# Patient Record
Sex: MALE | Race: WHITE | Employment: FULL TIME | URBAN - METROPOLITAN AREA
[De-identification: names, ages, dates, MRNs, and addresses within clinical notes are randomized per-mention and may not be internally consistent; named-entity substitution may affect disease eponyms.]

---

## 2020-11-02 PROBLEM — M54.12 CERVICAL RADICULOPATHY: Status: ACTIVE | Noted: 2020-11-02

## 2020-12-01 ENCOUNTER — APPOINTMENT (OUTPATIENT)
Dept: LAB | Age: 50
End: 2020-12-01
Attending: PHYSICIAN ASSISTANT
Payer: COMMERCIAL

## 2020-12-01 ENCOUNTER — LAB ENCOUNTER (OUTPATIENT)
Dept: LAB | Age: 50
End: 2020-12-01
Attending: PHYSICIAN ASSISTANT
Payer: COMMERCIAL

## 2020-12-01 DIAGNOSIS — Z11.59 ENCOUNTER FOR SCREENING FOR OTHER VIRAL DISEASES: ICD-10-CM

## 2020-12-01 DIAGNOSIS — M54.12 CERVICAL RADICULOPATHY: ICD-10-CM

## 2020-12-01 DIAGNOSIS — Z01.818 PREOP EXAMINATION: ICD-10-CM

## 2020-12-01 PROCEDURE — 93005 ELECTROCARDIOGRAM TRACING: CPT

## 2020-12-01 PROCEDURE — 93010 ELECTROCARDIOGRAM REPORT: CPT | Performed by: PHYSICIAN ASSISTANT

## 2020-12-01 RX ORDER — SODIUM CHLORIDE, SODIUM LACTATE, POTASSIUM CHLORIDE, CALCIUM CHLORIDE 600; 310; 30; 20 MG/100ML; MG/100ML; MG/100ML; MG/100ML
INJECTION, SOLUTION INTRAVENOUS CONTINUOUS
Status: CANCELLED | OUTPATIENT
Start: 2020-12-01

## 2020-12-02 NOTE — IMAGING NOTE
Spoke to [employer], Medical assistant about a H & P for Elie Sandoval, His procedure is 12/04/2020, asked to speak to a nurse, no nurse available, [employer] will call the patient for a H & P visit. Fax # given to Arianne Sesay.

## 2020-12-03 ENCOUNTER — ANESTHESIA EVENT (OUTPATIENT)
Dept: MRI IMAGING | Facility: HOSPITAL | Age: 50
End: 2020-12-03
Payer: COMMERCIAL

## 2020-12-03 NOTE — IMAGING NOTE
Spoke to Dr. Milton Butcher from Anesthesia, H&P was done on 11/02/2020, Dr. Milton Butcher stated, \"That's Ok to use for the procedure. \" Procedure on 12/04/2020.

## 2020-12-04 ENCOUNTER — ANESTHESIA (OUTPATIENT)
Dept: MRI IMAGING | Facility: HOSPITAL | Age: 50
End: 2020-12-04
Payer: COMMERCIAL

## 2020-12-04 ENCOUNTER — HOSPITAL ENCOUNTER (OUTPATIENT)
Dept: MRI IMAGING | Facility: HOSPITAL | Age: 50
Discharge: HOME | End: 2020-12-04
Attending: PHYSICIAN ASSISTANT
Payer: COMMERCIAL

## 2020-12-04 VITALS
RESPIRATION RATE: 18 BRPM | OXYGEN SATURATION: 95 % | WEIGHT: 280 LBS | DIASTOLIC BLOOD PRESSURE: 74 MMHG | HEART RATE: 95 BPM | TEMPERATURE: 97 F | SYSTOLIC BLOOD PRESSURE: 115 MMHG | BODY MASS INDEX: 37.93 KG/M2 | HEIGHT: 72 IN

## 2020-12-04 DIAGNOSIS — M54.2 NECK PAIN: ICD-10-CM

## 2020-12-04 DIAGNOSIS — M54.12 CERVICAL RADICULOPATHY: Primary | ICD-10-CM

## 2020-12-04 PROCEDURE — 72141 MRI NECK SPINE W/O DYE: CPT | Performed by: PHYSICIAN ASSISTANT

## 2020-12-04 RX ORDER — NALOXONE HYDROCHLORIDE 0.4 MG/ML
80 INJECTION, SOLUTION INTRAMUSCULAR; INTRAVENOUS; SUBCUTANEOUS AS NEEDED
Status: DISCONTINUED | OUTPATIENT
Start: 2020-12-04 | End: 2020-12-05

## 2020-12-04 RX ORDER — HYDROMORPHONE HYDROCHLORIDE 1 MG/ML
0.4 INJECTION, SOLUTION INTRAMUSCULAR; INTRAVENOUS; SUBCUTANEOUS EVERY 5 MIN PRN
Status: DISCONTINUED | OUTPATIENT
Start: 2020-12-04 | End: 2020-12-05

## 2020-12-04 RX ORDER — LIDOCAINE HYDROCHLORIDE 10 MG/ML
INJECTION, SOLUTION EPIDURAL; INFILTRATION; INTRACAUDAL; PERINEURAL AS NEEDED
Status: DISCONTINUED | OUTPATIENT
Start: 2020-12-04 | End: 2020-12-04 | Stop reason: SURG

## 2020-12-04 RX ORDER — MEPERIDINE HYDROCHLORIDE 25 MG/ML
12.5 INJECTION INTRAMUSCULAR; INTRAVENOUS; SUBCUTANEOUS AS NEEDED
Status: DISCONTINUED | OUTPATIENT
Start: 2020-12-04 | End: 2020-12-06

## 2020-12-04 RX ORDER — ONDANSETRON 2 MG/ML
4 INJECTION INTRAMUSCULAR; INTRAVENOUS AS NEEDED
Status: DISCONTINUED | OUTPATIENT
Start: 2020-12-04 | End: 2020-12-05

## 2020-12-04 RX ORDER — HYDROCODONE BITARTRATE AND ACETAMINOPHEN 5; 325 MG/1; MG/1
1 TABLET ORAL AS NEEDED
Status: DISCONTINUED | OUTPATIENT
Start: 2020-12-04 | End: 2020-12-06

## 2020-12-04 RX ORDER — HYDROMORPHONE HYDROCHLORIDE 1 MG/ML
INJECTION, SOLUTION INTRAMUSCULAR; INTRAVENOUS; SUBCUTANEOUS
Status: COMPLETED
Start: 2020-12-04 | End: 2020-12-04

## 2020-12-04 RX ORDER — HYDROCODONE BITARTRATE AND ACETAMINOPHEN 5; 325 MG/1; MG/1
2 TABLET ORAL AS NEEDED
Status: DISCONTINUED | OUTPATIENT
Start: 2020-12-04 | End: 2020-12-06

## 2020-12-04 RX ORDER — SODIUM CHLORIDE, SODIUM LACTATE, POTASSIUM CHLORIDE, CALCIUM CHLORIDE 600; 310; 30; 20 MG/100ML; MG/100ML; MG/100ML; MG/100ML
INJECTION, SOLUTION INTRAVENOUS CONTINUOUS
Status: DISCONTINUED | OUTPATIENT
Start: 2020-12-04 | End: 2020-12-06

## 2020-12-04 RX ADMIN — LIDOCAINE HYDROCHLORIDE 50 MG: 10 INJECTION, SOLUTION EPIDURAL; INFILTRATION; INTRACAUDAL; PERINEURAL at 15:59:00

## 2020-12-04 RX ADMIN — HYDROMORPHONE HYDROCHLORIDE 0.4 MG: 1 INJECTION, SOLUTION INTRAMUSCULAR; INTRAVENOUS; SUBCUTANEOUS at 17:13:00

## 2020-12-04 NOTE — ANESTHESIA POSTPROCEDURE EVALUATION
Dorina 31 Patient Status:  Outpatient   Age/Gender 44year old male MRN [id number]   [facility] MRI Attending Mae Gutierres, [address] Day # 0 PCP Hung Severino MD       Anesthesia Post-op Note    * No procedures liste

## 2020-12-04 NOTE — ANESTHESIA PREPROCEDURE EVALUATION
PRE-OP EVALUATION    Patient Name: Xenia Wu    Pre-op Diagnosis: * No surgery found *    * No surgery found *    * Surgery not found *    Pre-op vitals reviewed.   Temp: 97.2 °F (36.2 °C)  Pulse: 118  Resp: 18  BP: 131/83  SpO2: 94 %  Body mass history. Pulmonary    Pulmonary exam normal.                 Other findings            ASA: 3   Plan: general  NPO status verified and patient meets guidelines.         Comment:  I explained intrinsic risks of general anesthesia, including nausea, d

## 2020-12-04 NOTE — ANESTHESIA PROCEDURE NOTES
Airway  Urgency: elective      General Information and Staff    Patient location during procedure: OR  Anesthesiologist: Tanner Chauhan MD  Performed: anesthesiologist     Indications and Patient Condition  Indications for airway management: anesthesia  S

## 2020-12-18 PROBLEM — M50.20 HNP (HERNIATED NUCLEUS PULPOSUS), CERVICAL: Status: ACTIVE | Noted: 2020-12-18

## 2020-12-28 PROBLEM — I10 ESSENTIAL HYPERTENSION: Status: ACTIVE | Noted: 2020-12-28

## 2021-02-09 ENCOUNTER — APPOINTMENT (OUTPATIENT)
Dept: CT IMAGING | Facility: HOSPITAL | Age: 51
DRG: 684 | End: 2021-02-09
Attending: EMERGENCY MEDICINE
Payer: COMMERCIAL

## 2021-02-09 ENCOUNTER — HOSPITAL ENCOUNTER (INPATIENT)
Facility: HOSPITAL | Age: 51
LOS: 2 days | Discharge: HOME | DRG: 684 | End: 2021-02-11
Attending: EMERGENCY MEDICINE | Admitting: INTERNAL MEDICINE
Payer: COMMERCIAL

## 2021-02-09 DIAGNOSIS — N17.9 ACUTE RENAL FAILURE, UNSPECIFIED ACUTE RENAL FAILURE TYPE (HCC): Primary | ICD-10-CM

## 2021-02-09 LAB
ALBUMIN SERPL-MCNC: 3.5 G/DL (ref 3.4–5)
ALBUMIN/GLOB SERPL: 0.8 {RATIO} (ref 1–2)
ALP LIVER SERPL-CCNC: 53 U/L
ALT SERPL-CCNC: 93 U/L
ANION GAP SERPL CALC-SCNC: 7 MMOL/L (ref 0–18)
ANION GAP SERPL CALC-SCNC: 8 MMOL/L (ref 0–18)
AST SERPL-CCNC: 38 U/L (ref 15–37)
BASOPHILS # BLD AUTO: 0.08 X10(3) UL (ref 0–0.2)
BASOPHILS NFR BLD AUTO: 1.2 %
BILIRUB SERPL-MCNC: 0.4 MG/DL (ref 0.1–2)
BILIRUB UR QL: NEGATIVE
BUN BLD-MCNC: 63 MG/DL (ref 7–18)
BUN BLD-MCNC: 65 MG/DL (ref 7–18)
BUN/CREAT SERPL: 16.7 (ref 10–20)
BUN/CREAT SERPL: 16.9 (ref 10–20)
CALCIUM BLD-MCNC: 9.6 MG/DL (ref 8.5–10.1)
CALCIUM BLD-MCNC: 9.7 MG/DL (ref 8.5–10.1)
CHLORIDE SERPL-SCNC: 103 MMOL/L (ref 98–112)
CHLORIDE SERPL-SCNC: 106 MMOL/L (ref 98–112)
CHLORIDE UR-SCNC: 54 MMOL/L
CLARITY UR: CLEAR
CO2 SERPL-SCNC: 25 MMOL/L (ref 21–32)
CO2 SERPL-SCNC: 26 MMOL/L (ref 21–32)
COLOR UR: YELLOW
CREAT BLD-MCNC: 3.72 MG/DL
CREAT BLD-MCNC: 3.9 MG/DL
CREAT UR-SCNC: 77.1 MG/DL
CREAT UR-SCNC: 79.3 MG/DL
CREAT UR-SCNC: 79.3 MG/DL
CREAT UR-SCNC: 82.2 MG/DL
DEPRECATED RDW RBC AUTO: 44.9 FL (ref 35.1–46.3)
DEPRECATED RDW RBC AUTO: 45.1 FL (ref 35.1–46.3)
EOSINOPHIL # BLD AUTO: 0.18 X10(3) UL (ref 0–0.7)
EOSINOPHIL NFR BLD AUTO: 2.7 %
ERYTHROCYTE [DISTWIDTH] IN BLOOD BY AUTOMATED COUNT: 13.1 % (ref 11–15)
ERYTHROCYTE [DISTWIDTH] IN BLOOD BY AUTOMATED COUNT: 13.2 % (ref 11–15)
GLOBULIN PLAS-MCNC: 4.2 G/DL (ref 2.8–4.4)
GLUCOSE BLD-MCNC: 135 MG/DL (ref 70–99)
GLUCOSE BLD-MCNC: 150 MG/DL (ref 70–99)
GLUCOSE BLDC GLUCOMTR-MCNC: 121 MG/DL (ref 70–99)
GLUCOSE BLDC GLUCOMTR-MCNC: 135 MG/DL (ref 70–99)
GLUCOSE BLDC GLUCOMTR-MCNC: 138 MG/DL (ref 70–99)
GLUCOSE BLDC GLUCOMTR-MCNC: 138 MG/DL (ref 70–99)
GLUCOSE BLDC GLUCOMTR-MCNC: 155 MG/DL (ref 70–99)
GLUCOSE UR-MCNC: NEGATIVE MG/DL
HAV IGM SER QL: 2.2 MG/DL (ref 1.6–2.6)
HCT VFR BLD AUTO: 31.6 %
HCT VFR BLD AUTO: 33.1 %
HGB BLD-MCNC: 10.8 G/DL
HGB BLD-MCNC: 11.4 G/DL
HGB UR QL STRIP.AUTO: NEGATIVE
IMM GRANULOCYTES # BLD AUTO: 0.01 X10(3) UL (ref 0–1)
IMM GRANULOCYTES NFR BLD: 0.1 %
KETONES UR-MCNC: NEGATIVE MG/DL
LEUKOCYTE ESTERASE UR QL STRIP.AUTO: NEGATIVE
LYMPHOCYTES # BLD AUTO: 2.14 X10(3) UL (ref 1–4)
LYMPHOCYTES NFR BLD AUTO: 31.9 %
M PROTEIN MFR SERPL ELPH: 7.7 G/DL (ref 6.4–8.2)
MCH RBC QN AUTO: 32 PG (ref 26–34)
MCH RBC QN AUTO: 32 PG (ref 26–34)
MCHC RBC AUTO-ENTMCNC: 34.2 G/DL (ref 31–37)
MCHC RBC AUTO-ENTMCNC: 34.4 G/DL (ref 31–37)
MCV RBC AUTO: 93 FL
MCV RBC AUTO: 93.8 FL
MICROALBUMIN UR-MCNC: 0.53 MG/DL
MICROALBUMIN/CREAT 24H UR-RTO: 6.7 UG/MG (ref ?–30)
MONOCYTES # BLD AUTO: 0.69 X10(3) UL (ref 0.1–1)
MONOCYTES NFR BLD AUTO: 10.3 %
NEUTROPHILS # BLD AUTO: 3.61 X10 (3) UL (ref 1.5–7.7)
NEUTROPHILS # BLD AUTO: 3.61 X10(3) UL (ref 1.5–7.7)
NEUTROPHILS NFR BLD AUTO: 53.8 %
NITRITE UR QL STRIP.AUTO: NEGATIVE
OSMOLALITY SERPL CALC.SUM OF ELEC: 306 MOSM/KG (ref 275–295)
OSMOLALITY SERPL CALC.SUM OF ELEC: 306 MOSM/KG (ref 275–295)
PH UR: 5 [PH] (ref 5–8)
PLATELET # BLD AUTO: 227 10(3)UL (ref 150–450)
PLATELET # BLD AUTO: 233 10(3)UL (ref 150–450)
POTASSIUM SERPL-SCNC: 4.8 MMOL/L (ref 3.5–5.1)
POTASSIUM SERPL-SCNC: 4.9 MMOL/L (ref 3.5–5.1)
POTASSIUM UR-SCNC: 20.2 MMOL/L
PROT UR-MCNC: <5 MG/DL
PROT UR-MCNC: <5 MG/DL
PROT UR-MCNC: NEGATIVE MG/DL
RBC # BLD AUTO: 3.37 X10(6)UL
RBC # BLD AUTO: 3.56 X10(6)UL
SARS-COV-2 RNA RESP QL NAA+PROBE: NOT DETECTED
SODIUM SERPL-SCNC: 137 MMOL/L (ref 136–145)
SODIUM SERPL-SCNC: 138 MMOL/L (ref 136–145)
SODIUM SERPL-SCNC: 47 MMOL/L
SODIUM SERPL-SCNC: 51 MMOL/L
SP GR UR STRIP: 1.01 (ref 1–1.03)
UROBILINOGEN UR STRIP-ACNC: <2
WBC # BLD AUTO: 6.1 X10(3) UL (ref 4–11)
WBC # BLD AUTO: 6.7 X10(3) UL (ref 4–11)

## 2021-02-09 PROCEDURE — 96361 HYDRATE IV INFUSION ADD-ON: CPT

## 2021-02-09 PROCEDURE — 82570 ASSAY OF URINE CREATININE: CPT | Performed by: EMERGENCY MEDICINE

## 2021-02-09 PROCEDURE — 82436 ASSAY OF URINE CHLORIDE: CPT | Performed by: EMERGENCY MEDICINE

## 2021-02-09 PROCEDURE — 82570 ASSAY OF URINE CREATININE: CPT | Performed by: INTERNAL MEDICINE

## 2021-02-09 PROCEDURE — 84156 ASSAY OF PROTEIN URINE: CPT | Performed by: INTERNAL MEDICINE

## 2021-02-09 PROCEDURE — 84300 ASSAY OF URINE SODIUM: CPT | Performed by: EMERGENCY MEDICINE

## 2021-02-09 PROCEDURE — 84156 ASSAY OF PROTEIN URINE: CPT | Performed by: EMERGENCY MEDICINE

## 2021-02-09 PROCEDURE — 99285 EMERGENCY DEPT VISIT HI MDM: CPT

## 2021-02-09 PROCEDURE — 81003 URINALYSIS AUTO W/O SCOPE: CPT | Performed by: EMERGENCY MEDICINE

## 2021-02-09 PROCEDURE — 82962 GLUCOSE BLOOD TEST: CPT

## 2021-02-09 PROCEDURE — 74176 CT ABD & PELVIS W/O CONTRAST: CPT | Performed by: EMERGENCY MEDICINE

## 2021-02-09 PROCEDURE — 80048 BASIC METABOLIC PNL TOTAL CA: CPT | Performed by: EMERGENCY MEDICINE

## 2021-02-09 PROCEDURE — 96360 HYDRATION IV INFUSION INIT: CPT

## 2021-02-09 PROCEDURE — 85025 COMPLETE CBC W/AUTO DIFF WBC: CPT | Performed by: EMERGENCY MEDICINE

## 2021-02-09 PROCEDURE — 85027 COMPLETE CBC AUTOMATED: CPT | Performed by: INTERNAL MEDICINE

## 2021-02-09 PROCEDURE — 84133 ASSAY OF URINE POTASSIUM: CPT | Performed by: EMERGENCY MEDICINE

## 2021-02-09 PROCEDURE — 82043 UR ALBUMIN QUANTITATIVE: CPT | Performed by: INTERNAL MEDICINE

## 2021-02-09 PROCEDURE — 84300 ASSAY OF URINE SODIUM: CPT | Performed by: INTERNAL MEDICINE

## 2021-02-09 PROCEDURE — 83735 ASSAY OF MAGNESIUM: CPT | Performed by: INTERNAL MEDICINE

## 2021-02-09 PROCEDURE — 80053 COMPREHEN METABOLIC PANEL: CPT | Performed by: EMERGENCY MEDICINE

## 2021-02-09 RX ORDER — SODIUM CHLORIDE 9 MG/ML
INJECTION, SOLUTION INTRAVENOUS CONTINUOUS
Status: DISCONTINUED | OUTPATIENT
Start: 2021-02-09 | End: 2021-02-11

## 2021-02-09 RX ORDER — DEXTROSE MONOHYDRATE 25 G/50ML
50 INJECTION, SOLUTION INTRAVENOUS
Status: DISCONTINUED | OUTPATIENT
Start: 2021-02-09 | End: 2021-02-11

## 2021-02-09 RX ORDER — ONDANSETRON 2 MG/ML
4 INJECTION INTRAMUSCULAR; INTRAVENOUS EVERY 6 HOURS PRN
Status: DISCONTINUED | OUTPATIENT
Start: 2021-02-09 | End: 2021-02-11

## 2021-02-09 RX ORDER — HEPARIN SODIUM 5000 [USP'U]/ML
5000 INJECTION, SOLUTION INTRAVENOUS; SUBCUTANEOUS EVERY 8 HOURS SCHEDULED
Status: DISCONTINUED | OUTPATIENT
Start: 2021-02-09 | End: 2021-02-11

## 2021-02-09 RX ORDER — LIDOCAINE 50 MG/G
OINTMENT TOPICAL AS NEEDED
Status: DISCONTINUED | OUTPATIENT
Start: 2021-02-09 | End: 2021-02-11

## 2021-02-09 RX ORDER — PROCHLORPERAZINE EDISYLATE 5 MG/ML
5 INJECTION INTRAMUSCULAR; INTRAVENOUS EVERY 8 HOURS PRN
Status: DISCONTINUED | OUTPATIENT
Start: 2021-02-09 | End: 2021-02-11

## 2021-02-09 RX ORDER — SODIUM CHLORIDE 9 MG/ML
INJECTION, SOLUTION INTRAVENOUS CONTINUOUS
Status: ACTIVE | OUTPATIENT
Start: 2021-02-09 | End: 2021-02-09

## 2021-02-09 RX ORDER — GABAPENTIN 300 MG/1
300 CAPSULE ORAL NIGHTLY
Status: DISCONTINUED | OUTPATIENT
Start: 2021-02-09 | End: 2021-02-11

## 2021-02-09 RX ORDER — ONDANSETRON 2 MG/ML
4 INJECTION INTRAMUSCULAR; INTRAVENOUS EVERY 4 HOURS PRN
Status: DISCONTINUED | OUTPATIENT
Start: 2021-02-09 | End: 2021-02-11

## 2021-02-09 RX ORDER — ACETAMINOPHEN 325 MG/1
650 TABLET ORAL EVERY 6 HOURS PRN
Status: DISCONTINUED | OUTPATIENT
Start: 2021-02-09 | End: 2021-02-11

## 2021-02-09 RX ORDER — SODIUM CHLORIDE 9 MG/ML
1000 INJECTION, SOLUTION INTRAVENOUS ONCE
Status: COMPLETED | OUTPATIENT
Start: 2021-02-09 | End: 2021-02-09

## 2021-02-09 NOTE — CONSULTS
[facility] HOSP - [facility]    Report of Consultation    Manas Arnold Patient Status:  Inpatient    1970 MRN [id number]   Location [facility] 4W/SW/SE Attending Rufina Liang, DO   Hosp Day # 0 PCP Nish Haynes MD     Date of Admis tobacco: Never Used    Alcohol use: Not Currently      Comment: rarely    Drug use: No          Current Medications:    •  ondansetron HCl (ZOFRAN) injection 4 mg, 4 mg, Intravenous, Q4H PRN    •  0.9% NaCl infusion, , Intravenous, Continuous    •  Heparin (36.5 °C), temperature source Oral, resp. rate 18, height 6' (1.829 m), weight 285 lb (129.3 kg), SpO2 99 %. General: Obese No acute distress. Alert and oriented x 3. HEENT: Moist mucous membranes. EOM-I. PERRL  Neck: No JVD.   Respiratory: Clear to au valsartan, hydrochlorothiazide, triamterene and diclofenac.  - Hold above  - reduce IVF to [email or web address]    Renal calculi:  - Had previous calculi in the past  - currently non obstructive. - Will plan for OP evaluation post discharge.     HTN:  - Resume amlod

## 2021-02-09 NOTE — ED NOTES
Orders for admission, patient is aware of plan and ready to go upstairs.  Any questions, please call ED ANTHONY Adams at extension [phone]  Type of COVID test sent:Rapid  COVID Suspicion level: Low    Titratable drug(s) infusing:None  Rate:    LOC at time of t

## 2021-02-09 NOTE — ED PROVIDER NOTES
Patient Seen in: [facility] AND [facility] Emergency Department    History   Patient presents with:  Abnormal Result      HPI    Patient presents to the ED for evaluation.   Recent blood testing shows acute kidney failure and patient was referred to the ER for ev Smokeless tobacco: Never Used    Substance and Sexual Activity      Alcohol use: Not Currently        Comment: rarely      Drug use: No      ROS  Pertinent Positives: No complaints  All other organ systems are reviewed and are negative.     Constitutional a following components:       Result Value    Glucose 150 (*)     BUN 65 (*)     Creatinine 3.90 (*)     Calculated Osmolality 306 (*)     GFR, Non- 17 (*)     GFR, -American 19 (*)     All other components within normal limits   CBC W inflammation. Appendix is normal. No free air or free fluid. Mild hepatic steatosis. The results were faxed/finalized only at 0400 ET. If you would like to discuss this case directly please call [phone] (extension [phone]).   If you can't reach me at Medication List        Present on Admission  Date Reviewed: 2/7/2021          ICD-10-CM Noted POA    [facility] N17.9 2/9/2021 Unknown

## 2021-02-09 NOTE — H&P
[facility] Hospitalist H&P       CC:      PCP: Cem Fernandez MD    History of Present Illness:   Mr. Rima Lerma is a very pleasant 44year old with history of diabetes mellitus type 2 (recently diagnosed per patient), Hypertension, cervical disc disease, who prese Used    Alcohol use: Not Currently      Comment: rarely     Fam Hx  Family History   Problem Relation Age of Onset   • Diabetes Mother         Insulin dependent type 2   • Heart Disease Mother    • Other (Other) Maternal Grandfather         unknown      OB presents to the hospital for evaluation of abnormal kidney function. Acute kidney injury   -multifactorial, but high suspicion for ATN given recent NSAID use, on top of that 3 out of 4 of his BP meds can cause JERED. -UA with no casts.  No protein  -neph

## 2021-02-09 NOTE — ED NOTES
Pt sent by PCP for elevated renal function tests done today. Pt denies any complaints. Will continue to monitor.

## 2021-02-09 NOTE — PLAN OF CARE
Patient alert, oriented, stable and comfortable on room air throughout shift. Tolerating diet, ambulating independently, voiding freely.  IV fluids 0.9 running at 83mL/hr, topical Lidocaine cream ordered for Right sided shingles pain PRN as well as nightly comfort level or patient's stated pain goal  Description: INTERVENTIONS:  - Encourage pt to monitor pain and request assistance  - Assess pain using appropriate pain scale  - Administer analgesics based on type and severity of pain and evaluate response  - care, etc)  - Arrange for interpreters to assist at discharge as needed  - Consider post-discharge preferences of patient/family/discharge partner  - Complete POLST form as appropriate  - Assess patient's ability to be responsible for managing their own he

## 2021-02-09 NOTE — ED INITIAL ASSESSMENT (HPI)
Pt sent from PCP for abnormal labs, States his renal function results were elevated. Denies any urinary s/s.

## 2021-02-10 LAB
ALBUMIN SERPL-MCNC: 3.3 G/DL (ref 3.4–5)
ANION GAP SERPL CALC-SCNC: 7 MMOL/L (ref 0–18)
BUN BLD-MCNC: 55 MG/DL (ref 7–18)
BUN/CREAT SERPL: 17.7 (ref 10–20)
CALCIUM BLD-MCNC: 9.1 MG/DL (ref 8.5–10.1)
CHLORIDE SERPL-SCNC: 109 MMOL/L (ref 98–112)
CO2 SERPL-SCNC: 25 MMOL/L (ref 21–32)
CREAT BLD-MCNC: 3.11 MG/DL
GLUCOSE BLD-MCNC: 134 MG/DL (ref 70–99)
GLUCOSE BLDC GLUCOMTR-MCNC: 128 MG/DL (ref 70–99)
GLUCOSE BLDC GLUCOMTR-MCNC: 129 MG/DL (ref 70–99)
GLUCOSE BLDC GLUCOMTR-MCNC: 141 MG/DL (ref 70–99)
GLUCOSE BLDC GLUCOMTR-MCNC: 171 MG/DL (ref 70–99)
HAV IGM SER QL: 2.1 MG/DL (ref 1.6–2.6)
OSMOLALITY SERPL CALC.SUM OF ELEC: 309 MOSM/KG (ref 275–295)
PHOSPHATE SERPL-MCNC: 4.6 MG/DL (ref 2.5–4.9)
POTASSIUM SERPL-SCNC: 4.5 MMOL/L (ref 3.5–5.1)
SODIUM SERPL-SCNC: 141 MMOL/L (ref 136–145)

## 2021-02-10 PROCEDURE — 80069 RENAL FUNCTION PANEL: CPT | Performed by: INTERNAL MEDICINE

## 2021-02-10 PROCEDURE — 83735 ASSAY OF MAGNESIUM: CPT | Performed by: INTERNAL MEDICINE

## 2021-02-10 PROCEDURE — 82962 GLUCOSE BLOOD TEST: CPT

## 2021-02-10 NOTE — PLAN OF CARE
Vital signs stable. Patient is alert and oriented x4. Patient's activity/mobility level is independent. Patient is voiding freely. On Heparin for DVT prophylaxis. Fall precautions in place and maintained.  Call light within reach, bed locked and in lowest Short Term Goal  Description: Patient's Short Term Goal: Figure out why my lab values are high     Interventions:   - See additional Care Plan goals for specific interventions  2/10/2021 0751 by Ava Coreas RN  Outcome: Progressing  2/10/2021 0427 by Mel Holden risk of injury  - Provide assistive devices as appropriate  - Consider OT/PT consult to assist with strengthening/mobility  - Encourage toileting schedule  2/10/2021 8881 by Alberto Gordon RN  Outcome: Progressing  2/10/2021 0427 by Alberto Gordon RN  Ou

## 2021-02-10 NOTE — PROGRESS NOTES
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

## 2021-02-10 NOTE — PLAN OF CARE
Pt is alert/oriented. Vitals stable. Tolerating general diet. Blood sugars stable. Ambulating independently. IVF infusing. No complaints of pain. Heparin and SCDs for DVT prophylaxis. Voiding adequately. Last BM yesterday 2/9 .  Kidney labs improved today - level or patient's stated pain goal  Description: INTERVENTIONS:  - Encourage pt to monitor pain and request assistance  - Assess pain using appropriate pain scale  - Administer analgesics based on type and severity of pain and evaluate response  - Impleme etc)  - Arrange for interpreters to assist at discharge as needed  - Consider post-discharge preferences of patient/family/discharge partner  - Complete POLST form as appropriate  - Assess patient's ability to be responsible for managing their own health

## 2021-02-11 VITALS
HEART RATE: 78 BPM | DIASTOLIC BLOOD PRESSURE: 69 MMHG | HEIGHT: 72 IN | SYSTOLIC BLOOD PRESSURE: 123 MMHG | TEMPERATURE: 98 F | BODY MASS INDEX: 38.6 KG/M2 | RESPIRATION RATE: 18 BRPM | OXYGEN SATURATION: 94 % | WEIGHT: 285 LBS

## 2021-02-11 LAB
ALBUMIN SERPL-MCNC: 3.2 G/DL (ref 3.4–5)
ANION GAP SERPL CALC-SCNC: 5 MMOL/L (ref 0–18)
BUN BLD-MCNC: 49 MG/DL (ref 7–18)
BUN/CREAT SERPL: 17.9 (ref 10–20)
CALCIUM BLD-MCNC: 8.9 MG/DL (ref 8.5–10.1)
CHLORIDE SERPL-SCNC: 112 MMOL/L (ref 98–112)
CO2 SERPL-SCNC: 25 MMOL/L (ref 21–32)
CREAT BLD-MCNC: 2.74 MG/DL
GLUCOSE BLD-MCNC: 134 MG/DL (ref 70–99)
GLUCOSE BLDC GLUCOMTR-MCNC: 142 MG/DL (ref 70–99)
HAV IGM SER QL: 2 MG/DL (ref 1.6–2.6)
OSMOLALITY SERPL CALC.SUM OF ELEC: 309 MOSM/KG (ref 275–295)
PHOSPHATE SERPL-MCNC: 4.5 MG/DL (ref 2.5–4.9)
POTASSIUM SERPL-SCNC: 4.3 MMOL/L (ref 3.5–5.1)
SODIUM SERPL-SCNC: 142 MMOL/L (ref 136–145)

## 2021-02-11 PROCEDURE — 80069 RENAL FUNCTION PANEL: CPT | Performed by: INTERNAL MEDICINE

## 2021-02-11 PROCEDURE — 83735 ASSAY OF MAGNESIUM: CPT | Performed by: INTERNAL MEDICINE

## 2021-02-11 PROCEDURE — 82962 GLUCOSE BLOOD TEST: CPT

## 2021-02-11 RX ORDER — AMLODIPINE BESYLATE 5 MG/1
5 TABLET ORAL DAILY
Qty: 90 TABLET | Refills: 0 | Status: SHIPPED | OUTPATIENT
Start: 2021-02-12 | End: 2021-10-07 | Stop reason: ALTCHOICE

## 2021-02-11 NOTE — PROGRESS NOTES
[facility]    Progress Note    Jose Rafael Hoff Patient Status:  Inpatient    1970 MRN [id number]   Location [facility] 4W/SW/SE Attending Ab Bahena,    Hosp Day # 2 PCP Kee Rivera MD       Subjective:   Devyn Underwood above     Renal calculi:  - Had previous calculi in the past  - currently non obstructive.   - Will plan for OP evaluation post discharge.     HTN:  - Resume amlodipine  - Can start on coreg and/ or Hydralazine as needed  - Currently BP controlled.     Cerv

## 2021-02-11 NOTE — PLAN OF CARE
Pt alert and oriented x4. On room air. Tolerating diet, no nausea. Voiding freely, no issues. Kidney fx improving per labs. IVF. Ambulating independently with steady gait. Fall precautions in place. Cleared for discharge. Instructions given.  Education give goal  Description: INTERVENTIONS:  - Encourage pt to monitor pain and request assistance  - Assess pain using appropriate pain scale  - Administer analgesics based on type and severity of pain and evaluate response  - Implement non-pharmacological measures etc)  - Arrange for interpreters to assist at discharge as needed  - Consider post-discharge preferences of patient/family/discharge partner  - Complete POLST form as appropriate  - Assess patient's ability to be responsible for managing their own health

## 2021-02-11 NOTE — PLAN OF CARE
Patient alert and oriented. VSS. No complaints of pain. Patient ambulating. Voiding via urinal. Renal function improving, plan for discharge based off morning labs. Patient updated on plan of care.  Call light within reach, bed in lowest position, fall prec Encourage pt to monitor pain and request assistance  - Assess pain using appropriate pain scale  - Administer analgesics based on type and severity of pain and evaluate response  - Implement non-pharmacological measures as appropriate and evaluate response Consider post-discharge preferences of patient/family/discharge partner  - Complete POLST form as appropriate  - Assess patient's ability to be responsible for managing their own health  - Refer to Case Management Department for coordinating discharge plan

## 2021-02-11 NOTE — DISCHARGE SUMMARY
General Medicine Discharge Summary     Patient ID:  Clari Barboza  44year old  12/27/1970    Admit date: 2/9/2021    Discharge date and time: 2/11/21    Attending Physician: Sabrina Quispe DO daily.      CONTINUE these medications which have NOT CHANGED    glimepiride 2 MG Oral Tab  Take 1 tablet (2 mg total) by mouth daily with breakfast.    Lancets Does not apply Misc  Testing daily    Blood Glucose Monitoring Suppl w/Device Does not apply Ki

## 2021-02-24 ENCOUNTER — LAB ENCOUNTER (OUTPATIENT)
Dept: LAB | Age: 51
End: 2021-02-24
Attending: ORTHOPAEDIC SURGERY
Payer: COMMERCIAL

## 2021-02-24 DIAGNOSIS — N18.2 CHRONIC KIDNEY DISEASE (CKD), ACTIVE MEDICAL MANAGEMENT WITHOUT DIALYSIS, STAGE 2 (MILD): ICD-10-CM

## 2021-02-24 DIAGNOSIS — I10 ESSENTIAL HYPERTENSION: ICD-10-CM

## 2021-02-24 DIAGNOSIS — N20.0 NEPHROLITHIASIS: ICD-10-CM

## 2021-02-24 DIAGNOSIS — Z01.818 PREOP TESTING: ICD-10-CM

## 2021-02-24 DIAGNOSIS — N17.0 ACUTE RENAL FAILURE WITH TUBULAR NECROSIS (HCC): ICD-10-CM

## 2021-02-24 LAB
ALBUMIN SERPL-MCNC: 3.8 G/DL (ref 3.4–5)
ANION GAP SERPL CALC-SCNC: 4 MMOL/L (ref 0–18)
BUN BLD-MCNC: 19 MG/DL (ref 7–18)
BUN/CREAT SERPL: 11.7 (ref 10–20)
CALCIUM BLD-MCNC: 9.6 MG/DL (ref 8.5–10.1)
CHLORIDE SERPL-SCNC: 106 MMOL/L (ref 98–112)
CO2 SERPL-SCNC: 28 MMOL/L (ref 21–32)
CREAT BLD-MCNC: 1.62 MG/DL
CREAT UR-SCNC: 139 MG/DL
EST. AVERAGE GLUCOSE BLD GHB EST-MCNC: 180 MG/DL (ref 68–126)
GLUCOSE BLD-MCNC: 140 MG/DL (ref 70–99)
HBA1C MFR BLD HPLC: 7.9 % (ref ?–5.7)
MICROALBUMIN UR-MCNC: <0.5 MG/DL
OSMOLALITY SERPL CALC.SUM OF ELEC: 291 MOSM/KG (ref 275–295)
PHOSPHATE SERPL-MCNC: 2.3 MG/DL (ref 2.5–4.9)
POTASSIUM SERPL-SCNC: 3.9 MMOL/L (ref 3.5–5.1)
SODIUM SERPL-SCNC: 138 MMOL/L (ref 136–145)

## 2021-02-24 PROCEDURE — 80069 RENAL FUNCTION PANEL: CPT

## 2021-02-24 PROCEDURE — 82043 UR ALBUMIN QUANTITATIVE: CPT

## 2021-02-24 PROCEDURE — 83036 HEMOGLOBIN GLYCOSYLATED A1C: CPT

## 2021-02-24 PROCEDURE — 82570 ASSAY OF URINE CREATININE: CPT

## 2021-02-24 PROCEDURE — 36415 COLL VENOUS BLD VENIPUNCTURE: CPT

## 2021-02-24 RX ORDER — MULTIVIT-MIN/IRON FUM/FOLIC AC 7.5 MG-4
1 TABLET ORAL DAILY
COMMUNITY

## 2021-02-24 RX ORDER — GABAPENTIN 300 MG/1
300 CAPSULE ORAL DAILY
COMMUNITY
End: 2021-10-07 | Stop reason: ALTCHOICE

## 2021-02-25 LAB — SARS-COV-2 RNA RESP QL NAA+PROBE: NOT DETECTED

## 2021-02-26 ENCOUNTER — APPOINTMENT (OUTPATIENT)
Dept: GENERAL RADIOLOGY | Facility: HOSPITAL | Age: 51
End: 2021-02-26
Attending: ORTHOPAEDIC SURGERY
Payer: COMMERCIAL

## 2021-02-26 ENCOUNTER — HOSPITAL ENCOUNTER (OUTPATIENT)
Facility: HOSPITAL | Age: 51
Discharge: HOME | End: 2021-02-27
Attending: ORTHOPAEDIC SURGERY | Admitting: ORTHOPAEDIC SURGERY
Payer: COMMERCIAL

## 2021-02-26 ENCOUNTER — ANESTHESIA (OUTPATIENT)
Dept: SURGERY | Facility: HOSPITAL | Age: 51
End: 2021-02-26
Payer: COMMERCIAL

## 2021-02-26 ENCOUNTER — ANESTHESIA EVENT (OUTPATIENT)
Dept: SURGERY | Facility: HOSPITAL | Age: 51
End: 2021-02-26
Payer: COMMERCIAL

## 2021-02-26 DIAGNOSIS — M54.12 CERVICAL RADICULOPATHY: ICD-10-CM

## 2021-02-26 DIAGNOSIS — Z01.818 PREOP TESTING: Primary | ICD-10-CM

## 2021-02-26 LAB
GLUCOSE BLDC GLUCOMTR-MCNC: 159 MG/DL (ref 70–99)
GLUCOSE BLDC GLUCOMTR-MCNC: 175 MG/DL (ref 70–99)
GLUCOSE BLDC GLUCOMTR-MCNC: 196 MG/DL (ref 70–99)
GLUCOSE BLDC GLUCOMTR-MCNC: 269 MG/DL (ref 70–99)

## 2021-02-26 PROCEDURE — 82962 GLUCOSE BLOOD TEST: CPT

## 2021-02-26 PROCEDURE — 88304 TISSUE EXAM BY PATHOLOGIST: CPT | Performed by: ORTHOPAEDIC SURGERY

## 2021-02-26 PROCEDURE — 88311 DECALCIFY TISSUE: CPT | Performed by: ORTHOPAEDIC SURGERY

## 2021-02-26 PROCEDURE — 0RB30ZZ EXCISION OF CERVICAL VERTEBRAL DISC, OPEN APPROACH: ICD-10-PCS | Performed by: ORTHOPAEDIC SURGERY

## 2021-02-26 PROCEDURE — 01N10ZZ RELEASE CERVICAL NERVE, OPEN APPROACH: ICD-10-PCS | Performed by: ORTHOPAEDIC SURGERY

## 2021-02-26 PROCEDURE — 76000 FLUOROSCOPY <1 HR PHYS/QHP: CPT | Performed by: ORTHOPAEDIC SURGERY

## 2021-02-26 PROCEDURE — 0RG10A0 FUSION OF CERVICAL VERTEBRAL JOINT WITH INTERBODY FUSION DEVICE, ANTERIOR APPROACH, ANTERIOR COLUMN, OPEN APPROACH: ICD-10-PCS | Performed by: ORTHOPAEDIC SURGERY

## 2021-02-26 DEVICE — IMPLANTABLE DEVICE: Type: IMPLANTABLE DEVICE | Site: NECK | Status: FUNCTIONAL

## 2021-02-26 DEVICE — PLATE CERV SKY TI 1LVL 12MM: Type: IMPLANTABLE DEVICE | Site: NECK | Status: FUNCTIONAL

## 2021-02-26 DEVICE — 1.9-ACISPROTI360LORDOTIC/STAND: Type: IMPLANTABLE DEVICE | Site: NECK | Status: FUNCTIONAL

## 2021-02-26 RX ORDER — SCOLOPAMINE TRANSDERMAL SYSTEM 1 MG/1
1 PATCH, EXTENDED RELEASE TRANSDERMAL ONCE
Status: DISCONTINUED | OUTPATIENT
Start: 2021-02-26 | End: 2021-02-26

## 2021-02-26 RX ORDER — FAMOTIDINE 20 MG/1
20 TABLET ORAL ONCE
Status: COMPLETED | OUTPATIENT
Start: 2021-02-26 | End: 2021-02-26

## 2021-02-26 RX ORDER — MORPHINE SULFATE 4 MG/ML
4 INJECTION, SOLUTION INTRAMUSCULAR; INTRAVENOUS EVERY 10 MIN PRN
Status: DISCONTINUED | OUTPATIENT
Start: 2021-02-26 | End: 2021-02-26 | Stop reason: HOSPADM

## 2021-02-26 RX ORDER — MORPHINE SULFATE 2 MG/ML
2 INJECTION, SOLUTION INTRAMUSCULAR; INTRAVENOUS EVERY 2 HOUR PRN
Status: DISCONTINUED | OUTPATIENT
Start: 2021-02-26 | End: 2021-02-27

## 2021-02-26 RX ORDER — SODIUM PHOSPHATE, DIBASIC AND SODIUM PHOSPHATE, MONOBASIC 7; 19 G/133ML; G/133ML
1 ENEMA RECTAL ONCE AS NEEDED
Status: DISCONTINUED | OUTPATIENT
Start: 2021-02-26 | End: 2021-02-27

## 2021-02-26 RX ORDER — ONDANSETRON 2 MG/ML
4 INJECTION INTRAMUSCULAR; INTRAVENOUS EVERY 4 HOURS PRN
Status: DISCONTINUED | OUTPATIENT
Start: 2021-02-26 | End: 2021-02-27

## 2021-02-26 RX ORDER — SODIUM CHLORIDE 9 MG/ML
INJECTION, SOLUTION INTRAVENOUS CONTINUOUS PRN
Status: DISCONTINUED | OUTPATIENT
Start: 2021-02-26 | End: 2021-02-26 | Stop reason: SURG

## 2021-02-26 RX ORDER — METHOCARBAMOL 750 MG/1
750 TABLET, FILM COATED ORAL EVERY 6 HOURS PRN
Status: DISCONTINUED | OUTPATIENT
Start: 2021-02-26 | End: 2021-02-27

## 2021-02-26 RX ORDER — DEXAMETHASONE SODIUM PHOSPHATE 4 MG/ML
VIAL (ML) INJECTION AS NEEDED
Status: DISCONTINUED | OUTPATIENT
Start: 2021-02-26 | End: 2021-02-26 | Stop reason: SURG

## 2021-02-26 RX ORDER — ONDANSETRON 2 MG/ML
4 INJECTION INTRAMUSCULAR; INTRAVENOUS ONCE AS NEEDED
Status: DISCONTINUED | OUTPATIENT
Start: 2021-02-26 | End: 2021-02-26 | Stop reason: HOSPADM

## 2021-02-26 RX ORDER — DEXAMETHASONE SODIUM PHOSPHATE 10 MG/ML
10 INJECTION, SOLUTION INTRAMUSCULAR; INTRAVENOUS EVERY 8 HOURS
Status: DISCONTINUED | OUTPATIENT
Start: 2021-02-26 | End: 2021-02-26

## 2021-02-26 RX ORDER — NEOSTIGMINE METHYLSULFATE 1 MG/ML
INJECTION INTRAVENOUS AS NEEDED
Status: DISCONTINUED | OUTPATIENT
Start: 2021-02-26 | End: 2021-02-26 | Stop reason: SURG

## 2021-02-26 RX ORDER — DEXTROSE MONOHYDRATE 25 G/50ML
50 INJECTION, SOLUTION INTRAVENOUS
Status: DISCONTINUED | OUTPATIENT
Start: 2021-02-26 | End: 2021-02-26 | Stop reason: HOSPADM

## 2021-02-26 RX ORDER — ONDANSETRON 2 MG/ML
INJECTION INTRAMUSCULAR; INTRAVENOUS AS NEEDED
Status: DISCONTINUED | OUTPATIENT
Start: 2021-02-26 | End: 2021-02-26 | Stop reason: SURG

## 2021-02-26 RX ORDER — ACETAMINOPHEN 500 MG
1000 TABLET ORAL ONCE
Status: COMPLETED | OUTPATIENT
Start: 2021-02-26 | End: 2021-02-26

## 2021-02-26 RX ORDER — ACETAMINOPHEN 325 MG/1
650 TABLET ORAL EVERY 4 HOURS PRN
Status: DISCONTINUED | OUTPATIENT
Start: 2021-02-26 | End: 2021-02-27

## 2021-02-26 RX ORDER — ROCURONIUM BROMIDE 10 MG/ML
INJECTION, SOLUTION INTRAVENOUS AS NEEDED
Status: DISCONTINUED | OUTPATIENT
Start: 2021-02-26 | End: 2021-02-26 | Stop reason: SURG

## 2021-02-26 RX ORDER — HYDROCODONE BITARTRATE AND ACETAMINOPHEN 5; 325 MG/1; MG/1
1 TABLET ORAL AS NEEDED
Status: DISCONTINUED | OUTPATIENT
Start: 2021-02-26 | End: 2021-02-26 | Stop reason: HOSPADM

## 2021-02-26 RX ORDER — SODIUM CHLORIDE, SODIUM LACTATE, POTASSIUM CHLORIDE, CALCIUM CHLORIDE 600; 310; 30; 20 MG/100ML; MG/100ML; MG/100ML; MG/100ML
INJECTION, SOLUTION INTRAVENOUS CONTINUOUS
Status: DISCONTINUED | OUTPATIENT
Start: 2021-02-26 | End: 2021-02-27

## 2021-02-26 RX ORDER — HYDROMORPHONE HYDROCHLORIDE 1 MG/ML
0.6 INJECTION, SOLUTION INTRAMUSCULAR; INTRAVENOUS; SUBCUTANEOUS EVERY 5 MIN PRN
Status: DISCONTINUED | OUTPATIENT
Start: 2021-02-26 | End: 2021-02-26 | Stop reason: HOSPADM

## 2021-02-26 RX ORDER — HYDROCODONE BITARTRATE AND ACETAMINOPHEN 10; 325 MG/1; MG/1
2 TABLET ORAL EVERY 4 HOURS PRN
Status: DISCONTINUED | OUTPATIENT
Start: 2021-02-26 | End: 2021-02-27

## 2021-02-26 RX ORDER — POLYETHYLENE GLYCOL 3350 17 G/17G
17 POWDER, FOR SOLUTION ORAL DAILY PRN
Status: DISCONTINUED | OUTPATIENT
Start: 2021-02-26 | End: 2021-02-27

## 2021-02-26 RX ORDER — TIZANIDINE 2 MG/1
2 TABLET ORAL ONCE
Status: COMPLETED | OUTPATIENT
Start: 2021-02-26 | End: 2021-02-26

## 2021-02-26 RX ORDER — MIDAZOLAM HYDROCHLORIDE 1 MG/ML
INJECTION INTRAMUSCULAR; INTRAVENOUS AS NEEDED
Status: DISCONTINUED | OUTPATIENT
Start: 2021-02-26 | End: 2021-02-26 | Stop reason: SURG

## 2021-02-26 RX ORDER — DIPHENHYDRAMINE HYDROCHLORIDE 50 MG/ML
25 INJECTION INTRAMUSCULAR; INTRAVENOUS EVERY 4 HOURS PRN
Status: DISCONTINUED | OUTPATIENT
Start: 2021-02-26 | End: 2021-02-27

## 2021-02-26 RX ORDER — DIPHENHYDRAMINE HCL 25 MG
25 CAPSULE ORAL EVERY 4 HOURS PRN
Status: DISCONTINUED | OUTPATIENT
Start: 2021-02-26 | End: 2021-02-27

## 2021-02-26 RX ORDER — METOCLOPRAMIDE 10 MG/1
10 TABLET ORAL ONCE
Status: COMPLETED | OUTPATIENT
Start: 2021-02-26 | End: 2021-02-26

## 2021-02-26 RX ORDER — GLYCOPYRROLATE 0.2 MG/ML
INJECTION, SOLUTION INTRAMUSCULAR; INTRAVENOUS AS NEEDED
Status: DISCONTINUED | OUTPATIENT
Start: 2021-02-26 | End: 2021-02-26 | Stop reason: SURG

## 2021-02-26 RX ORDER — LIDOCAINE HYDROCHLORIDE 10 MG/ML
INJECTION, SOLUTION EPIDURAL; INFILTRATION; INTRACAUDAL; PERINEURAL AS NEEDED
Status: DISCONTINUED | OUTPATIENT
Start: 2021-02-26 | End: 2021-02-26 | Stop reason: SURG

## 2021-02-26 RX ORDER — PHENYLEPHRINE HCL 10 MG/ML
VIAL (ML) INJECTION AS NEEDED
Status: DISCONTINUED | OUTPATIENT
Start: 2021-02-26 | End: 2021-02-26 | Stop reason: SURG

## 2021-02-26 RX ORDER — DEXTROSE MONOHYDRATE 25 G/50ML
50 INJECTION, SOLUTION INTRAVENOUS
Status: DISCONTINUED | OUTPATIENT
Start: 2021-02-26 | End: 2021-02-27

## 2021-02-26 RX ORDER — HYDROMORPHONE HYDROCHLORIDE 1 MG/ML
0.4 INJECTION, SOLUTION INTRAMUSCULAR; INTRAVENOUS; SUBCUTANEOUS EVERY 5 MIN PRN
Status: DISCONTINUED | OUTPATIENT
Start: 2021-02-26 | End: 2021-02-26 | Stop reason: HOSPADM

## 2021-02-26 RX ORDER — DOCUSATE SODIUM 100 MG/1
100 CAPSULE, LIQUID FILLED ORAL 2 TIMES DAILY
Status: DISCONTINUED | OUTPATIENT
Start: 2021-02-26 | End: 2021-02-27

## 2021-02-26 RX ORDER — SODIUM CHLORIDE, SODIUM LACTATE, POTASSIUM CHLORIDE, CALCIUM CHLORIDE 600; 310; 30; 20 MG/100ML; MG/100ML; MG/100ML; MG/100ML
INJECTION, SOLUTION INTRAVENOUS CONTINUOUS
Status: DISCONTINUED | OUTPATIENT
Start: 2021-02-26 | End: 2021-02-26 | Stop reason: HOSPADM

## 2021-02-26 RX ORDER — OXYCODONE HYDROCHLORIDE 5 MG/1
10 TABLET ORAL ONCE
Status: COMPLETED | OUTPATIENT
Start: 2021-02-26 | End: 2021-02-26

## 2021-02-26 RX ORDER — MORPHINE SULFATE 4 MG/ML
4 INJECTION, SOLUTION INTRAMUSCULAR; INTRAVENOUS EVERY 2 HOUR PRN
Status: DISCONTINUED | OUTPATIENT
Start: 2021-02-26 | End: 2021-02-27

## 2021-02-26 RX ORDER — PROCHLORPERAZINE EDISYLATE 5 MG/ML
5 INJECTION INTRAMUSCULAR; INTRAVENOUS ONCE AS NEEDED
Status: DISCONTINUED | OUTPATIENT
Start: 2021-02-26 | End: 2021-02-26 | Stop reason: HOSPADM

## 2021-02-26 RX ORDER — NALOXONE HYDROCHLORIDE 0.4 MG/ML
80 INJECTION, SOLUTION INTRAMUSCULAR; INTRAVENOUS; SUBCUTANEOUS AS NEEDED
Status: DISCONTINUED | OUTPATIENT
Start: 2021-02-26 | End: 2021-02-26 | Stop reason: HOSPADM

## 2021-02-26 RX ORDER — PROCHLORPERAZINE EDISYLATE 5 MG/ML
10 INJECTION INTRAMUSCULAR; INTRAVENOUS EVERY 6 HOURS PRN
Status: DISCONTINUED | OUTPATIENT
Start: 2021-02-26 | End: 2021-02-27

## 2021-02-26 RX ORDER — DEXAMETHASONE SODIUM PHOSPHATE 4 MG/ML
10 VIAL (ML) INJECTION ONCE
Status: COMPLETED | OUTPATIENT
Start: 2021-02-26 | End: 2021-02-26

## 2021-02-26 RX ORDER — MORPHINE SULFATE 2 MG/ML
1 INJECTION, SOLUTION INTRAMUSCULAR; INTRAVENOUS EVERY 2 HOUR PRN
Status: DISCONTINUED | OUTPATIENT
Start: 2021-02-26 | End: 2021-02-27

## 2021-02-26 RX ORDER — SENNOSIDES 8.6 MG
17.2 TABLET ORAL NIGHTLY
Status: DISCONTINUED | OUTPATIENT
Start: 2021-02-26 | End: 2021-02-27

## 2021-02-26 RX ORDER — AMLODIPINE BESYLATE 5 MG/1
5 TABLET ORAL DAILY
Status: DISCONTINUED | OUTPATIENT
Start: 2021-02-26 | End: 2021-02-27

## 2021-02-26 RX ORDER — MORPHINE SULFATE 10 MG/ML
6 INJECTION, SOLUTION INTRAMUSCULAR; INTRAVENOUS EVERY 10 MIN PRN
Status: DISCONTINUED | OUTPATIENT
Start: 2021-02-26 | End: 2021-02-26 | Stop reason: HOSPADM

## 2021-02-26 RX ORDER — HYDROMORPHONE HYDROCHLORIDE 1 MG/ML
0.2 INJECTION, SOLUTION INTRAMUSCULAR; INTRAVENOUS; SUBCUTANEOUS EVERY 5 MIN PRN
Status: DISCONTINUED | OUTPATIENT
Start: 2021-02-26 | End: 2021-02-26 | Stop reason: HOSPADM

## 2021-02-26 RX ORDER — BISACODYL 10 MG
10 SUPPOSITORY, RECTAL RECTAL
Status: DISCONTINUED | OUTPATIENT
Start: 2021-02-26 | End: 2021-02-27

## 2021-02-26 RX ORDER — HYDROCODONE BITARTRATE AND ACETAMINOPHEN 5; 325 MG/1; MG/1
2 TABLET ORAL AS NEEDED
Status: DISCONTINUED | OUTPATIENT
Start: 2021-02-26 | End: 2021-02-26 | Stop reason: HOSPADM

## 2021-02-26 RX ORDER — HYDROCODONE BITARTRATE AND ACETAMINOPHEN 10; 325 MG/1; MG/1
1 TABLET ORAL EVERY 4 HOURS PRN
Status: DISCONTINUED | OUTPATIENT
Start: 2021-02-26 | End: 2021-02-27

## 2021-02-26 RX ORDER — GABAPENTIN 300 MG/1
300 CAPSULE ORAL DAILY
Status: DISCONTINUED | OUTPATIENT
Start: 2021-02-26 | End: 2021-02-27

## 2021-02-26 RX ORDER — MORPHINE SULFATE 4 MG/ML
2 INJECTION, SOLUTION INTRAMUSCULAR; INTRAVENOUS EVERY 10 MIN PRN
Status: DISCONTINUED | OUTPATIENT
Start: 2021-02-26 | End: 2021-02-26 | Stop reason: HOSPADM

## 2021-02-26 RX ADMIN — ROCURONIUM BROMIDE 50 MG: 10 INJECTION, SOLUTION INTRAVENOUS at 12:20:00

## 2021-02-26 RX ADMIN — PHENYLEPHRINE HCL 150 MCG: 10 MG/ML VIAL (ML) INJECTION at 12:57:00

## 2021-02-26 RX ADMIN — GLYCOPYRROLATE 0.8 MG: 0.2 INJECTION, SOLUTION INTRAMUSCULAR; INTRAVENOUS at 15:13:00

## 2021-02-26 RX ADMIN — SODIUM CHLORIDE: 9 INJECTION, SOLUTION INTRAVENOUS at 12:24:00

## 2021-02-26 RX ADMIN — DEXAMETHASONE SODIUM PHOSPHATE 6 MG: 4 MG/ML VIAL (ML) INJECTION at 15:17:00

## 2021-02-26 RX ADMIN — SODIUM CHLORIDE, SODIUM LACTATE, POTASSIUM CHLORIDE, CALCIUM CHLORIDE: 600; 310; 30; 20 INJECTION, SOLUTION INTRAVENOUS at 13:04:00

## 2021-02-26 RX ADMIN — ONDANSETRON 4 MG: 2 INJECTION INTRAMUSCULAR; INTRAVENOUS at 12:16:00

## 2021-02-26 RX ADMIN — SODIUM CHLORIDE: 9 INJECTION, SOLUTION INTRAVENOUS at 13:04:00

## 2021-02-26 RX ADMIN — GLYCOPYRROLATE 0.2 MG: 0.2 INJECTION, SOLUTION INTRAMUSCULAR; INTRAVENOUS at 12:16:00

## 2021-02-26 RX ADMIN — NEOSTIGMINE METHYLSULFATE 5 MG: 1 INJECTION INTRAVENOUS at 15:13:00

## 2021-02-26 RX ADMIN — DEXAMETHASONE SODIUM PHOSPHATE 4 MG: 4 MG/ML VIAL (ML) INJECTION at 12:16:00

## 2021-02-26 RX ADMIN — SODIUM CHLORIDE: 9 INJECTION, SOLUTION INTRAVENOUS at 15:18:00

## 2021-02-26 RX ADMIN — MIDAZOLAM HYDROCHLORIDE 2 MG: 1 INJECTION INTRAMUSCULAR; INTRAVENOUS at 12:15:00

## 2021-02-26 RX ADMIN — LIDOCAINE HYDROCHLORIDE 50 MG: 10 INJECTION, SOLUTION EPIDURAL; INFILTRATION; INTRACAUDAL; PERINEURAL at 12:20:00

## 2021-02-26 RX ADMIN — ROCURONIUM BROMIDE 20 MG: 10 INJECTION, SOLUTION INTRAVENOUS at 12:46:00

## 2021-02-26 RX ADMIN — ROCURONIUM BROMIDE 10 MG: 10 INJECTION, SOLUTION INTRAVENOUS at 13:39:00

## 2021-02-26 RX ADMIN — ROCURONIUM BROMIDE 10 MG: 10 INJECTION, SOLUTION INTRAVENOUS at 13:19:00

## 2021-02-26 RX ADMIN — PHENYLEPHRINE HCL 100 MCG: 10 MG/ML VIAL (ML) INJECTION at 12:51:00

## 2021-02-26 NOTE — PROGRESS NOTES
Zelalem Eric ,    I have reviewed your labs below are my recommendations and/or comments please let us know if you have any questions. :    - renal function is stable around baseline  - No significant protein in your urine which is good ( protein

## 2021-02-26 NOTE — ANESTHESIA PROCEDURE NOTES
Airway  Urgency: Elective    Airway not difficult    General Information and Staff    Patient location during procedure: OR  Anesthesiologist: Faisal Merida MD  Resident/CRNA: Mony King CRNA  Performed: anesthesiologist and CRNA     Randi

## 2021-02-26 NOTE — INTERVAL H&P NOTE
Pre-op Diagnosis: Cervical radiculopathy [M54.12]    The above referenced H&P was reviewed by Marilu Ren MD on 2/26/2021, the patient was examined and no significant changes have occurred in the patient's condition since the H&P was performed.   I

## 2021-02-26 NOTE — ANESTHESIA PROCEDURE NOTES
Peripheral IV  Date/Time: 2/26/2021 12:24 PM  Inserted by: Shey Barney MD    Placement  Needle size: 18 G  Laterality: right  Location: hand  Local anesthetic: none  Site prep: alcohol  Technique: anatomical landmarks  Attempts: 1

## 2021-02-26 NOTE — H&P (VIEW-ONLY)
Patient: Umm Emery  Medical Record Number: [id number]  Referring Physician:  No ref. provider found  PCP: Kyra Godfrey MD    HISTORY OF CHIEF COMPLAINT:    CC:Neck and Left Upper Extremity Symptoms.      HPI: Umm Emery is a 44 superior limbic keratoconjunctivitis - improved   • OTHER DISEASES     tailbone - lump    • Renal disorder    • Visual impairment       Past Surgical History:   Procedure Laterality Date   • CERVICAL EPIDURAL N/A 12/18/2020    Performed by Gerson Stringer,

## 2021-02-26 NOTE — ANESTHESIA POSTPROCEDURE EVALUATION
Patient: Sofia Gomez    Procedure Summary     Date: 02/26/21 Room / Location: [facility] OR 04 / [facility] OR    Anesthesia Start: 6380 Anesthesia Stop:     Procedure: ANTERIOR CERVICAL FUSION BG & INST 1 LEVEL (Bilateral Neck) Diagnosis:       Isela Sheridan

## 2021-02-26 NOTE — ANESTHESIA PREPROCEDURE EVALUATION
Anesthesia PreOp Note    HPI:     Leonard Severs is a 44year old male who presents for preoperative consultation requested by: Kimmy Amin MD    Date of Surgery: 2/26/2021    Procedure(s):  ANTERIOR CERVICAL FUSION BG & INST 1 LEVEL  Indica Past Week at Unknown time    •  amLODIPine Besylate (NORVASC) 5 MG Oral Tab, Take 1 tablet (5 mg total) by mouth daily. , Disp: 90 tablet, Rfl: 0, 2/26/2021 at 0800    •  glimepiride 2 MG Oral Tab, Take 1 tablet (2 mg total) by mouth daily with breakfast., session: Not on file      Stress: Not on file    Relationships      Social connections        Talks on phone: Not on file        Gets together: Not on file        Attends [employer]: Not on file        Active member of club or organization: Not on f 108   Resp:  16   Temp:  97.8 °F (36.6 °C)   TempSrc:  Oral   SpO2:  97%   Weight: 129.3 kg (285 lb) 128.4 kg (283 lb)   Height: 1.829 m (6')         Anesthesia Evaluation     Patient summary reviewed and Nursing notes reviewed    No history of anesthetic

## 2021-02-26 NOTE — H&P
[facility] Hospitalist H&P       CC: neck pain     PCP: Jv Gonzalez MD    History of Present Illness:   Mr. Luis Man is a very pleasant 44year old with history of diabetes mellitus type 2 (recently diagnosed per patient), Hypertension, cervical disc disease, Disease Mother    • Other (Other) Maternal Grandfather         unknown        Review of Systems  Comprehensive ROS reviewed and negative except for what's stated above.      OBJECTIVE:  /84 (BP Location: Right arm)   Pulse 75   Temp 98.2 °F (36.8 °C) (recently diagnosed per patient), Hypertension,  CKD with recent admit for JERED, cervical disc disease, who presents for cervical fusion     Cervical stenosis s/p cervical fusion  - oral meds for pain control wean  as able  - Adv diet, zofran for nausea, OB

## 2021-02-27 ENCOUNTER — APPOINTMENT (OUTPATIENT)
Dept: GENERAL RADIOLOGY | Facility: HOSPITAL | Age: 51
End: 2021-02-27
Attending: ORTHOPAEDIC SURGERY
Payer: COMMERCIAL

## 2021-02-27 VITALS
OXYGEN SATURATION: 98 % | DIASTOLIC BLOOD PRESSURE: 83 MMHG | TEMPERATURE: 98 F | BODY MASS INDEX: 38.33 KG/M2 | SYSTOLIC BLOOD PRESSURE: 139 MMHG | HEART RATE: 83 BPM | WEIGHT: 283 LBS | HEIGHT: 72 IN | RESPIRATION RATE: 18 BRPM

## 2021-02-27 LAB
ANION GAP SERPL CALC-SCNC: 6 MMOL/L (ref 0–18)
BASOPHILS # BLD AUTO: 0.01 X10(3) UL (ref 0–0.2)
BASOPHILS NFR BLD AUTO: 0.1 %
BUN BLD-MCNC: 17 MG/DL (ref 7–18)
BUN/CREAT SERPL: 10.2 (ref 10–20)
CALCIUM BLD-MCNC: 8.8 MG/DL (ref 8.5–10.1)
CHLORIDE SERPL-SCNC: 108 MMOL/L (ref 98–112)
CO2 SERPL-SCNC: 25 MMOL/L (ref 21–32)
CREAT BLD-MCNC: 1.67 MG/DL
DEPRECATED RDW RBC AUTO: 43.9 FL (ref 35.1–46.3)
EOSINOPHIL # BLD AUTO: 0 X10(3) UL (ref 0–0.7)
EOSINOPHIL NFR BLD AUTO: 0 %
ERYTHROCYTE [DISTWIDTH] IN BLOOD BY AUTOMATED COUNT: 13.2 % (ref 11–15)
GLUCOSE BLD-MCNC: 158 MG/DL (ref 70–99)
GLUCOSE BLDC GLUCOMTR-MCNC: 154 MG/DL (ref 70–99)
GLUCOSE BLDC GLUCOMTR-MCNC: 160 MG/DL (ref 70–99)
HAV IGM SER QL: 1.9 MG/DL (ref 1.6–2.6)
HCT VFR BLD AUTO: 32.3 %
HGB BLD-MCNC: 11.3 G/DL
IMM GRANULOCYTES # BLD AUTO: 0.07 X10(3) UL (ref 0–1)
IMM GRANULOCYTES NFR BLD: 0.7 %
LYMPHOCYTES # BLD AUTO: 1.46 X10(3) UL (ref 1–4)
LYMPHOCYTES NFR BLD AUTO: 13.8 %
MCH RBC QN AUTO: 32.3 PG (ref 26–34)
MCHC RBC AUTO-ENTMCNC: 35 G/DL (ref 31–37)
MCV RBC AUTO: 92.3 FL
MONOCYTES # BLD AUTO: 1.02 X10(3) UL (ref 0.1–1)
MONOCYTES NFR BLD AUTO: 9.6 %
NEUTROPHILS # BLD AUTO: 8.05 X10 (3) UL (ref 1.5–7.7)
NEUTROPHILS # BLD AUTO: 8.05 X10(3) UL (ref 1.5–7.7)
NEUTROPHILS NFR BLD AUTO: 75.8 %
OSMOLALITY SERPL CALC.SUM OF ELEC: 293 MOSM/KG (ref 275–295)
PLATELET # BLD AUTO: 211 10(3)UL (ref 150–450)
POTASSIUM SERPL-SCNC: 4.3 MMOL/L (ref 3.5–5.1)
RBC # BLD AUTO: 3.5 X10(6)UL
SODIUM SERPL-SCNC: 139 MMOL/L (ref 136–145)
WBC # BLD AUTO: 10.6 X10(3) UL (ref 4–11)

## 2021-02-27 PROCEDURE — 80048 BASIC METABOLIC PNL TOTAL CA: CPT | Performed by: HOSPITALIST

## 2021-02-27 PROCEDURE — 97166 OT EVAL MOD COMPLEX 45 MIN: CPT

## 2021-02-27 PROCEDURE — 82962 GLUCOSE BLOOD TEST: CPT

## 2021-02-27 PROCEDURE — 72040 X-RAY EXAM NECK SPINE 2-3 VW: CPT | Performed by: ORTHOPAEDIC SURGERY

## 2021-02-27 PROCEDURE — 97161 PT EVAL LOW COMPLEX 20 MIN: CPT

## 2021-02-27 PROCEDURE — 97116 GAIT TRAINING THERAPY: CPT

## 2021-02-27 PROCEDURE — 83735 ASSAY OF MAGNESIUM: CPT | Performed by: HOSPITALIST

## 2021-02-27 PROCEDURE — 85025 COMPLETE CBC W/AUTO DIFF WBC: CPT | Performed by: HOSPITALIST

## 2021-02-27 PROCEDURE — 97535 SELF CARE MNGMENT TRAINING: CPT

## 2021-02-27 RX ORDER — HYDROCODONE BITARTRATE AND ACETAMINOPHEN 10; 325 MG/1; MG/1
1 TABLET ORAL EVERY 4 HOURS PRN
Qty: 30 TABLET | Refills: 0 | Status: SHIPPED | OUTPATIENT
Start: 2021-02-27 | End: 2021-03-12

## 2021-02-27 RX ORDER — METHOCARBAMOL 750 MG/1
750 TABLET, FILM COATED ORAL EVERY 6 HOURS PRN
Qty: 30 TABLET | Refills: 0 | Status: SHIPPED | OUTPATIENT
Start: 2021-02-27 | End: 2021-04-08

## 2021-02-27 RX ORDER — PSEUDOEPHEDRINE HCL 30 MG
100 TABLET ORAL 2 TIMES DAILY
Qty: 30 CAPSULE | Refills: 0 | Status: SHIPPED | OUTPATIENT
Start: 2021-02-27 | End: 2021-10-07 | Stop reason: ALTCHOICE

## 2021-02-27 NOTE — PLAN OF CARE
callum is updated on discharge plan- he is agreeable to d/c home. Educated on incisioon care, s/s infection. pain meds schedule and pain med side effects. wife edcuated on dressing change- suppies sent home. all questions answered. F/U MD appt scheduled.  Soniya Rothman pressure ulcer development  - Assess and document skin integrity  - Monitor for areas of redness and/or skin breakdown  - Initiate interventions, skin care algorithm/standards of care as needed  Outcome: Progressing- anterior neck dressing changed- dry and precautions as indicated by assessment.  - Educate pt/family on patient safety including physical limitations  - Instruct pt to call for assistance with activity based on assessment  - Modify environment to reduce risk of injury  - Provide assistive device

## 2021-02-27 NOTE — PHYSICAL THERAPY NOTE
PHYSICAL THERAPY EVALUATION - INPATIENT     Room Number: 404/404-A  Evaluation Date: 2/27/2021  Type of Evaluation: Initial   Physician Order: PT Eval and Treat    Presenting Problem: C6-7 anterior cervical disectomy and fusion  Reason for Therapy: Mobili status. The patient's Approx Degree of Impairment: 46.58% has been calculated based on documentation in the [facility] '6 clicks' Inpatient Basic Mobility Short Form.   Research supports that patients with this level of impairment may benefit from home with Prior Level of [place]: IND    SUBJECTIVE  \"The walker is helpful for the support. \"    PHYSICAL THERAPY EXAMINATION     OBJECTIVE  Precautions: Spine  Fall Risk: Standard fall risk    WEIGHT BEARING RESTRICTION  Weight Bearing Restriction: None training  Posture  Strengthening  Transfer training    Patient End of Session: Up in chair;Needs met;Call light within reach;RN aware of session/findings; All patient questions and concerns addressed; Family present    CURRENT GOALS    Goals to be met by: 3/

## 2021-02-27 NOTE — OCCUPATIONAL THERAPY NOTE
OCCUPATIONAL THERAPY EVALUATION - INPATIENT     Room Number: 404/404-A  Evaluation Date: 2/27/2021  Type of Evaluation: Initial  Presenting Problem: C6-7 anterior cervical disectomy and fusion    Physician Order: IP Consult to Occupational Therapy  Nicole some numbness and weakness in his left index finger, but MD is aware. The patient's Approx Degree of Impairment: 32.79% has been calculated based on documentation in the [facility] '6 clicks' Inpatient Daily Activity Short Form.   Research supports that zaira all ADLs and IADLs. Patient lives in a [address] [place] [address],[address] with wife and 7yo son. 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 SUBJECTIVE  \" I just am scared I may need help wiping after toileting. \"    OCCUPATIONAL THERAPY EXAMINATION      OBJECTIVE  Precautio Plus    FUNCTIONAL ADL ASSESSMENT  Grooming: Independent  Feeding: Independent  Bathing: Supervision  Toileting: Supervision  Upper Extremity Dressing: Independent  Lower Extremity Dressing: Supervision    Education Provided: role of OT, fall prevention, b

## 2021-02-27 NOTE — PROGRESS NOTES
[facility]  Progress Note    Francisco Dee Patient Status:  Inpatient    1970 MRN [id number]   Location [facility] 4W/SW/SE Attending Vadim Alvarado MD   [facility] Day # 1 PCP Annabelle Mercedes MD     Subjective:  Joanne Jeronimo

## 2021-02-27 NOTE — DISCHARGE SUMMARY
General Medicine Discharge Summary     Patient ID:  Xenia Wu  44year old  12/27/1970    Admit date: 2/26/2021    Discharge date and time: 2/27/2021    Attending Physician: Marcelle Connelly MD     Consults: IP CONSULT TO HOSPITALIST  DEE CON amlodipine     Diabetes mellitus type 2 c/b nephropathy  -resume oral meds     Obesity with co morbidity  - weight loss endeavors post op        Operative Procedures: Procedure(s) (LRB):  ANTERIOR CERVICAL FUSION BG & INST 1 LEVEL (Bilateral)     Imaging: NEURONTIN     glimepiride 2 MG Tabs  Commonly known as: AMARYL  Take 1 tablet (2 mg total) by mouth daily with breakfast.     Lancets Misc  Testing daily     Multi-Vitamin/Minerals Tabs           Where to Get Your Medications      These medications were se objects above the level of your shoulders. • Bend at the knees and hips, maintaining a straight back and neck.   • Typically, your restrictions will be lessened at your two-week follow-up appointment, however we appreciate your compliance with restrictions pain, so be mindful of this when returning to driving. Travel  • Avoid air travel and long-distance automobile travel the first two weeks following surgery. Sleeping  • You will require plenty of rest and sleep after surgery.    • Use the position inspect your wound. You will find adhesive tapes (steri-strips) crossing your wound, please leave these intact.  If your wound continues to have fresh drainage, place a fresh gauze, apply a new adhesive dressing and leave in place until your first post-oper measures do not work. • Contact your pharmacist, family doctor, or surgeon for advice. No Smoking  • Smoking will inhibit healing. • Smoking has been clearly shown to inhibit solid bony fusion and is counterproductive to the nature of your surgery. Gently bending and stretching is OK. • Gentle massage applied to the muscle spasm may help reduce discomfort. Medication  • You may be prescribed a specific NSAID by your surgeon unless contraindicated.  Please take as directed as this will help with pa this appointment. • We recommend making a list of questions to bring with you as it is not uncommon for patients to forget questions while being seen. • Schedule a one week follow up with your Primary Care Physician.  It is a good opportunity to review al with warm salt water (1/2 tsp in 4 oz warm water) or use a throat lozenge for comfort  · To feel muscle aches or soreness especially in the abdomen, chest or neck.  The achy feeling should go away in the next 24 hours  · To feel weak, sleepy or \"wiped out\

## 2021-02-27 NOTE — OPERATIVE REPORT
Operative Note     Patient Name: Clari Barboza  Preoperative Diagnosis:  C6/7 HNP  Radiculopathy  Postoperative Diagnosis: Same  Primary Surgeon: Fabiola Benjamin MD  Assistant: Ryan KISER  Procedures:   1.) C6/7 Anterior Cervical Dis Williams Bradley is an 44year old male who presented with a history of neck and left upper extremity pain, numbness, and weakness, with symptoms refractory to nonsurgical care. Workup included radiographs and axial imaging.  The patient was diagnosed The patient was met in the preop holding area, we reviewed elements of the patient's history and physical examination along with the planned surgical procedure. The patient was in agreement and the surgical site was marked with indelible ink.   The patient A transverse incision, consistent with the Moctezuma-Polanco approach, was made at the C6/7 level. The skin was incised with a scalpel and dissection was carried out with electrocautery. The subplatysmal membrane was then dissected free.   The carey At this point the caspar pins were then removed, hemostasis was achieved an appropriate sized DePuy Synthes Perla plate was selected and applied.   Screws were placed and a radiograph obtained to confirm position of the graft and evaluate plate fixation

## 2021-02-27 NOTE — PLAN OF CARE
Problem: Patient Centered Care  Goal: Patient preferences are identified and integrated in the patient's plan of care  Description: Interventions:  - What would you like us to know as we care for you?   From home with wife and have pre existing LUE numbne nonpharmacologic comfort measures as appropriate  - Advance diet as tolerated, if ordered  - Obtain nutritional consult as needed  - Evaluate fluid balance  Outcome: Progressing     Problem: GENITOURINARY - ADULT  Goal: Absence of urinary retention  Geraldine Interventions:  - Assess ability and encourage patient to participate in ADLs to maximize function  - Promote sitting position while performing ADLs such as feeding, grooming, and bathing  - Educate and encourage patient/family in tolerated functional acti toileting schedule  Outcome: Progressing     Problem: DISCHARGE PLANNING  Goal: Discharge to home or other facility with appropriate resources  Description: INTERVENTIONS:  - Identify barriers to discharge w/pt and caregiver  - Include patient/family/disch

## 2021-03-31 PROBLEM — E11.65 TYPE 2 DIABETES MELLITUS WITH HYPERGLYCEMIA, WITHOUT LONG-TERM CURRENT USE OF INSULIN (HCC): Status: ACTIVE | Noted: 2021-03-31

## 2021-05-09 NOTE — PROGRESS NOTES
[facility] Hospitalist Progress Note     CC: Hospital Follow up    PCP: Kulwinder Quevedo MD       Assessment/Plan:   Mr. Amos Saravia is a very pleasant 44year old with history of diabetes mellitus type 2 (recently diagnosed per patient), Hypertension, cervical disc dis kg)      /67 (BP Location: Right arm)   Pulse 74   Temp 97.8 °F (36.6 °C) (Oral)   Resp 18   Ht 6' (1.829 m)   Wt 285 lb (129.3 kg)   SpO2 97%   BMI 38.65 kg/m²   General: Alert, no acute distress  Lungs: clear to ausculation bilaterally  Heart: Regu pt with deep tissue injury upon admit tresa    Asrar Dang, DO

## (undated) DEVICE — SUTURE MONOCRYL 3-0 Y936H

## (undated) DEVICE — DRAPE SHEET LG

## (undated) DEVICE — DRAPE SHEET LAPAROTOMY

## (undated) DEVICE — PEN: MARKING STD PT 100/CS: Brand: MEDICAL ACTION INDUSTRIES

## (undated) DEVICE — DISTRACTION SCREW: Brand: MAYFIELD®

## (undated) DEVICE — CHLORAPREP ORANGE TINT 10.5ML

## (undated) DEVICE — TZ MEDICAL TITANIUM DISTRACTION PINS 14MM: Brand: TZ MEDICAL TITANIUM DISTRACTION PINS

## (undated) DEVICE — C-ARMOR C-ARM EQUIPMENT COVERS CLEAR STERILE UNIVERSAL FIT 12 PER CASE: Brand: C-ARMOR

## (undated) DEVICE — SUTURE VICRYL 3-0 J761D

## (undated) DEVICE — GAMMEX® PI HYBRID SIZE 8.5, STERILE POWDER-FREE SURGICAL GLOVE, POLYISOPRENE AND NEOPRENE BLEND: Brand: GAMMEX

## (undated) DEVICE — FORCEP CUSH BAY BP DISP 7.5IN

## (undated) DEVICE — INSULATED BLADE ELECTRODE 6.5

## (undated) DEVICE — CERVICAL CDS: Brand: MEDLINE INDUSTRIES, INC.

## (undated) DEVICE — SUTURE SILK 2-0 SA85H

## (undated) DEVICE — TAPE POROUS CLOTH 3X10 YD

## (undated) DEVICE — ENCORE® PERRY STYLE 42 PF SIZE 7, STERILE LATEX POWDER-FREE SURGICAL GLOVE: Brand: ENCORE

## (undated) DEVICE — ENCORE® LATEX ACCLAIM SIZE 7, STERILE LATEX POWDER-FREE SURGICAL GLOVE: Brand: ENCORE

## (undated) DEVICE — DISSECTOR 9/16X.25IN KTNR SPNG

## (undated) DEVICE — FLOSEAL HEMOSTATIC MATRIX, 5ML: Brand: FLOSEAL HEMOSTATIC MATRIX

## (undated) DEVICE — GAUZE SPONGES,12 PLY: Brand: CURITY

## (undated) DEVICE — NVM5 MULTIMODALITY SURFACE KIT

## (undated) DEVICE — SOL  .9 1000ML BTL

## (undated) DEVICE — SKYLINE ANTERIOR CERVICAL PLATE SYSTEM DRILL 2.2 X 16MM: Brand: SKYLINE

## (undated) DEVICE — 3.0MM PRECISION NEURO (MATCH HEAD)

## (undated) DEVICE — 4.0MM BARREL

## (undated) DEVICE — DRAIN JACKSON PRATT RND7FR END: Brand: CARDINAL HEALTH

## (undated) DEVICE — DRAIN RESERVOIR RELIAVAC 100CC

## (undated) DEVICE — 3M™ TEGADERM™ TRANSPARENT FILM DRESSING, 1626W, 4 IN X 4-3/4 IN (10 CM X 12 CM), 50 EACH/CARTON, 4 CARTON/CASE: Brand: 3M™ TEGADERM™

## (undated) DEVICE — MICRO KOVER: Brand: UNBRANDED

## (undated) NOTE — LETTER
[facility]  Phone: [phone]  Right Fax: [phone]    ADDRESSEE INFORMATION: SENDER INFORMATION:   To:   Dr Payal Mcfadden From: Sugar Meraz     Department: [facility]   Fax Number: [phone] Date: 12/1/2020     Phone This message is intended only for the use of the individual or entity to which it is addressed. It may have been disclosed to you from records whose confidentiality is protected by [employer] and applicable state law. Federal Regulation, [address]  [address] part

## (undated) NOTE — LETTER
[facility] [address], [address] [place] [address]    Consent for Anesthesia   1.    Mirian Blas agree to be cared for by a physician anesthesiologist alone and/or with a nurse anesthetist, who is specially trained to monitor me and give me m · Rare risks include: remembering what happened during my procedure, allergic reactions to medications, injury to my airway, heart, lungs, vision, nerves, or muscles and in extremely rare instances death.   5. My doctor has explained to me other choices gilbert Patient Name: Zerita Mohs     : 1970                 Printed: 2020 at 2:15 PM    Medical Record #: [id number]                                            Page 1 of 1